# Patient Record
Sex: FEMALE | URBAN - METROPOLITAN AREA
[De-identification: names, ages, dates, MRNs, and addresses within clinical notes are randomized per-mention and may not be internally consistent; named-entity substitution may affect disease eponyms.]

---
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
## 2024-02-06 ENCOUNTER — ATHLETIC TRAINING (OUTPATIENT)
Dept: SPORTS MEDICINE | Facility: OTHER | Age: 19
End: 2024-02-06

## 2024-02-06 DIAGNOSIS — M25.512 LEFT SHOULDER PAIN, UNSPECIFIED CHRONICITY: Primary | ICD-10-CM

## 2024-02-07 NOTE — PROGRESS NOTES
Athletic Training Shoulder Evaluation    Name: Ebonie Resendiz  Age: 18 y.o.   School District: [employer]  Sport: Golf  Date of Assessment: 2/6/2024    Assessment/Plan:     Visit Diagnosis: Left shoulder pain, unspecified chronicity [M25.512]    Treatment Plan: See Below    []  Follow-up PRN.   []  Follow-up prior to next practice/game for re-evaluation.  [x]  Daily treatment/rehab. Progress note expected weekly.     Referral:     [x]  Not needed at this time  []  Referred to:     [x]  Coaching staff notified  []  Parent/Guardian Notified    Subjective:    Date of Injury: 2/6/2024    Injury occurred during:     [x]  Practice  []  Competition  []  Other:     Mechanism: Overuse    Previous History: PMHX of L shoulder injury from 4yrs ago    Reported Symptoms:     [] Felt/heard a pop [] Pressure   [] Pain with rest [] Locking   [x] Pain with activity [] Burning   [] Pain with overhead activity [x] Weakness   [] Paresthesia [] Loss of motion   [] Sharp pain [] Crepitus   [x] Dull pain [x] Clicking   [] Radiating pain [] Popping sensation   [] Felt give way [] Snapping sensation   [] FOOSH [] Cervical pain     Objective:    Observation:     []  No observable findings compared bilaterally    [] Swelling [] Asymmetry (in motion)   [] Ecchymosis [] Winged scapula   [] Deformity [x] Scapular dyskinesis   [] Atrophy [] Uneven shoulders   [x] Muscle spasm [] Spine curvature     Palpation: Trigger point tenderness present along her upper traps and rhomboids    Active Range of Motion:      Full  ROM Limited  ROM Pain  with  ROM No  Motion   Shoulder Flexion [x] [] [] []   Shoulder Extension [x] [] [] []   Shoulder Abduction [x] [] [] []   Shoulder Adduction [x] [] [] []   Horizontal Abduction [x] [] [] []   Horizontal Adduction [x] [] [] []   Internal Rotation  [] [] [] []   Internal Rotation  [] [] [] []   Scapular Retraction  [] [] [] []   Scapular Protraction [] [] [] []     Manual Muscle Tests:     Not performed  []             5 4+ 4 4- 3 or  Under   Shoulder Flexion [] [x] [] [] []   Shoulder Extension [] [x] [] [] []   Shoulder Abduction [] [x] [] [] []   Shoulder Adduction [] [x] [] [] []   Horizontal Abduction [] [x] [] [] []   Horizontal Adduction [] [x] [] [] []   Internal Rotation  [] [x] [] [] []   External Rotation  [] [x] [] [] []     Special Tests:      (+)  POS (-)  NEG Not  Tested   Anterior Apprehension [] [] [x]   Relocation [] [] [x]   Posterior Apprehension [] [] [x]   Anterior Load & Shift [] [] [x]   AC Compression [] [] [x]   Sulcus Sign [] [] []   Clunk [] [] []   Crank [] [] []   Drop Arm [] [] []   Empty Can [] [] []   Camron's [] [] []   Speed's [] [] []   Live's [x] [] []   Manuela's [] [] []   Dayton's [] [x] []   Cayden's [] [x] []   Cesar's [] [] []     Treatment Log:     Date: 2/6/24   Playing Status: As Tolerated       Exercise/Treatment    Banded ER/IR 2x10 blue band   Front Raise 2x10 red band   Lateral raise 2x10 red band   Rows 2x10 grey

## 2024-02-09 ENCOUNTER — ATHLETIC TRAINING (OUTPATIENT)
Dept: SPORTS MEDICINE | Facility: OTHER | Age: 19
End: 2024-02-09

## 2024-02-09 DIAGNOSIS — M25.512 LEFT SHOULDER PAIN, UNSPECIFIED CHRONICITY: Primary | ICD-10-CM

## 2024-02-09 NOTE — PROGRESS NOTES
Athletic Training Daily Note    Name: Ebonie Resendiz  Age: 18 y.o.     Visit Diagnosis: Left shoulder pain, unspecified chronicity [M25.512]    Subjective: [facility] to [facility]. Wants cupping.    Objective: Did cups on left shoulder as well as shoulder stretches    Treatment Log: